# Patient Record
Sex: MALE | Race: WHITE | NOT HISPANIC OR LATINO | Employment: FULL TIME | URBAN - METROPOLITAN AREA
[De-identification: names, ages, dates, MRNs, and addresses within clinical notes are randomized per-mention and may not be internally consistent; named-entity substitution may affect disease eponyms.]

---

## 2022-06-02 ENCOUNTER — PRE VISIT (OUTPATIENT)
Dept: UROLOGY | Facility: CLINIC | Age: 37
End: 2022-06-02
Payer: COMMERCIAL

## 2022-06-02 NOTE — TELEPHONE ENCOUNTER
Action 22 MMT   Action Taken  CSS called [facility] no answer, unable to leave a message. [facility] faxed second urgent request to [facility].       Action 2022 JTV 9:52 AM    Action Taken CSS called patient. Patient did not . [facility] LVM for patient to return call.     @ 10:38 AM, Patient returned call. Patient confirmed he moved from [place] and wants to establish care. Patient has seen Dr Harlan Ferrer with [facility] for the Neurogenic bladder/spina bifida. Patient's PCP is Dr Henok Lucas with [facility]. Patient gave vB OK to update medical records.     @10:53 AM, [facility] sent a request to [facility] for images to be pushed. Fax: [phone]       MEDICAL RECORDS REQUEST   [facility]  Urology Clinic  [address] [place] [address]  PHONE: [phone]  Fax: [phone]        FUTURE VISIT INFORMATION                                                   Ruiz Black, : 1985 scheduled for future visit at [facility]    APPOINTMENT INFORMATION:    Date: 2022    Provider:  Abelino Wylie MD    Reason for Visit/Diagnosis: Neurogenic bladder/spina bifida      RECORDS REQUESTED FOR VISIT                                                     NOTES  STATUS/DETAILS   OFFICE NOTE from other specialist  Yes, 2020, 2019, 2019, 2017 -- Harlan Ferrer MD -- [facility]   OP Visit  Yes, 2019, 2018, 2017 -- Harlan Ferrer MD -- [facility]   MEDICATION LIST  yes, [facility]   LABS     URINALYSIS (UA)  yes   NEUROGENIC BLADDER      NM KIDNEY/ RENAL pending Yes, 2019 -- [facility]    2014    RENAL/BLADDER ULTRASOUND (IMAGES & REPORT) pending Yes, 2017 -- US RENAL -- [facility]     PRE-VISIT CHECKLIST      Record collection complete If no, please explain pending   Appointment appropriately  scheduled           (right time/right provider) Yes   Joint diagnostic appointment coordinated correctly          (ensure right order & amount of time) Yes   MyChart activation Yes   Questionnaire complete If no, please explain pending

## 2022-06-05 ENCOUNTER — HEALTH MAINTENANCE LETTER (OUTPATIENT)
Age: 37
End: 2022-06-05

## 2022-06-16 ENCOUNTER — PRE VISIT (OUTPATIENT)
Dept: UROLOGY | Facility: CLINIC | Age: 37
End: 2022-06-16
Payer: COMMERCIAL

## 2022-06-16 DIAGNOSIS — N31.9 NEUROGENIC BLADDER: Primary | ICD-10-CM

## 2022-06-16 NOTE — TELEPHONE ENCOUNTER
Reason for visit: Establish care      Relevant information: history of spina bifida, bladder augmentation, CIC    Records/imaging/labs/orders: some records available, orders placed for renal US per protocol    Pt called: Message routed to scheduling to set up imaging    At Rooming: julia Castaneda CMA  6/16/2022  4:00 PM

## 2022-06-24 ENCOUNTER — HOSPITAL ENCOUNTER (OUTPATIENT)
Dept: ULTRASOUND IMAGING | Facility: CLINIC | Age: 37
Discharge: HOME | End: 2022-06-24
Attending: UROLOGY | Admitting: UROLOGY
Payer: COMMERCIAL

## 2022-06-24 DIAGNOSIS — N31.9 NEUROGENIC BLADDER: ICD-10-CM

## 2022-06-24 PROCEDURE — 76770 US EXAM ABDO BACK WALL COMP: CPT

## 2022-06-27 ENCOUNTER — OFFICE VISIT (OUTPATIENT)
Dept: UROLOGY | Facility: CLINIC | Age: 37
End: 2022-06-27
Payer: COMMERCIAL

## 2022-06-27 VITALS
BODY MASS INDEX: 32.95 KG/M2 | HEART RATE: 78 BPM | WEIGHT: 205 LBS | HEIGHT: 66 IN | DIASTOLIC BLOOD PRESSURE: 93 MMHG | SYSTOLIC BLOOD PRESSURE: 137 MMHG

## 2022-06-27 DIAGNOSIS — N31.9 NEUROGENIC BLADDER: Primary | ICD-10-CM

## 2022-06-27 PROCEDURE — 99203 OFFICE O/P NEW LOW 30 MIN: CPT | Performed by: UROLOGY

## 2022-06-27 RX ORDER — DIAZEPAM 5 MG
TABLET ORAL
COMMUNITY
Start: 2022-03-28

## 2022-06-27 RX ORDER — TESTOSTERONE CYPIONATE 200 MG/ML
200 INJECTION, SOLUTION INTRAMUSCULAR
COMMUNITY
Start: 2022-05-09

## 2022-06-27 ASSESSMENT — PAIN SCALES - GENERAL: PAINLEVEL: NO PAIN (0)

## 2022-06-27 NOTE — NURSING NOTE
"Chief Complaint   Patient presents with     Consult     Neurogenic bladder - history of spina bifida, CIC 4 x daily. UTI one week ago - muscle aches, fatigue. History of bladder augment. Catheterizes using native urethra.       Blood pressure (!) 137/93, pulse 78, height 1.676 m (5' 6\"), weight 93 kg (205 lb). Body mass index is 33.09 kg/m .    There is no problem list on file for this patient.      No Known Allergies    Current Outpatient Medications   Medication Sig Dispense Refill     testosterone cypionate (DEPOTESTOSTERONE) 200 MG/ML injection Inject 200 mg into the muscle       diazepam (VALIUM) 5 MG tablet TAKE 1/2 TO 1 TABLET DAILY AS NEEDED FOR MUSCLE SPASMS         Social History     Tobacco Use     Smoking status: Never Smoker     Smokeless tobacco: Never Used       Sepideh Castaneda CMA  6/27/2022  10:56 AM     "

## 2022-06-27 NOTE — LETTER
Date:June 27, 2022      Patient was self referred, no letter generated. Do not send.        [facility] Information

## 2022-06-27 NOTE — PATIENT INSTRUCTIONS
Please schedule a renal US in one year and a follow-up visit with Katie Chinchilla PA-C to review the results and symptoms.    It was a pleasure meeting with you today.  Thank you for allowing me and my team the privilege of caring for you today.  YOU are the reason we are here, and I truly hope we provided you with the excellent service you deserve.  Please let us know if there is anything else we can do for you so that we can be sure you are leaving completely satisfied with your care experience.

## 2022-06-27 NOTE — PROGRESS NOTES
"HPI:  Ruiz Black is a 36 year old male being seen for establishing care for neurogenic bladder.    Bladder augment with ureters as a child. CIC per urethra with 14F Coude qid    Stable long-standing right hydro    Does not do any irrigation.    1 UTI/yr    T replacement    Reviewed previous notes from Dr. Stone from PC service at [facility]    Exam:  BP (!) 137/93   Pulse 78   Ht 1.676 m (5' 6\")   Wt 93 kg (205 lb)   BMI 33.09 kg/m    General: age-appropriate appearing male in NAD sitting in a wheelchair  Abdomen: Degree of obesity is mild. Abdomen is soft and nontender. No organomegaly. Surgical scars include midline.  Motor: excellent strength in UEs but weak in LEs    Review of Imaging:  The following imaging exams were independently viewed and interpreted by me and discussed with patient:  Renal/Bladder Ultrasound: Abnormal: mild right hydro; no stones    Review of Labs:  The following labs were reviewed by me and discussed with the patient:  T=794    Assessment & Plan   1. neurogenic bladder from spina bifida  2. Doing well. rtc by video with gabriella in 1 year by video    Abelino Wylie MD  [facility] [place]      ==========================      Additional Coding Information:    Problems:  4 -- two or more stable chronic illnesses    Data Reviewed  Review of external notes as documented above     Tests ordered: none    Independent interpretation of a test performed by another physician/other qualified health care professional (not separately reported) - [place]  Level of risk:  3 -- low risk (e.g., OTC medication or observation, minor surgery without risks)    Time spent:  20 minutes spent on the date of the encounter doing chart review, history and exam, documentation and further activities per the note          "

## 2022-06-27 NOTE — LETTER
"6/27/2022       RE: Ruiz Black  [address] [address] [place] [address]     Dear Colleague,    Thank you for referring your patient, Ruiz Black, to the [facility] [place] at [facility]. Please see a copy of my visit note below.    HPI:  Ruiz Black is a 36 year old male being seen for establishing care for neurogenic bladder.    Bladder augment with ureters as a child. CIC per urethra with 14F Coude qid    Stable long-standing right hydro    Does not do any irrigation.    1 UTI/yr    T replacement    Reviewed previous notes from Dr. Stone from PC service at [facility]    Exam:  BP (!) 137/93   Pulse 78   Ht 1.676 m (5' 6\")   Wt 93 kg (205 lb)   BMI 33.09 kg/m    General: age-appropriate appearing male in NAD sitting in a wheelchair  Abdomen: Degree of obesity is mild. Abdomen is soft and nontender. No organomegaly. Surgical scars include midline.  Motor: excellent strength in UEs but weak in LEs    Review of Imaging:  The following imaging exams were independently viewed and interpreted by me and discussed with patient:  Renal/Bladder Ultrasound: Abnormal: mild right hydro; no stones    Review of Labs:  The following labs were reviewed by me and discussed with the patient:  T=794    Assessment & Plan   1. neurogenic bladder from spina bifida  2. Doing well. rtc by video with gabriella in 1 year by video    Abelino Wylie MD  [facility] [place]      ==========================      Additional Coding Information:    Problems:  4 -- two or more stable chronic illnesses    Data Reviewed  Review of external notes as documented above     Tests ordered: none    Independent interpretation of a test performed by another physician/other qualified health care professional (not separately reported) - US  Level of risk:  3 -- low risk (e.g., OTC medication or observation, minor surgery without " risks)    Time spent:  20 minutes spent on the date of the encounter doing chart review, history and exam, documentation and further activities per the note              Again, thank you for allowing me to participate in the care of your patient.      Sincerely,    Abelino Wylie MD

## 2022-10-16 ENCOUNTER — HEALTH MAINTENANCE LETTER (OUTPATIENT)
Age: 37
End: 2022-10-16

## 2023-06-02 ENCOUNTER — PRE VISIT (OUTPATIENT)
Dept: UROLOGY | Facility: CLINIC | Age: 38
End: 2023-06-02
Payer: COMMERCIAL

## 2023-06-26 ENCOUNTER — HOSPITAL ENCOUNTER (OUTPATIENT)
Dept: ULTRASOUND IMAGING | Facility: CLINIC | Age: 38
Discharge: HOME | End: 2023-06-26
Attending: UROLOGY | Admitting: UROLOGY
Payer: COMMERCIAL

## 2023-06-26 DIAGNOSIS — N31.9 NEUROGENIC BLADDER: ICD-10-CM

## 2023-06-26 PROCEDURE — 76770 US EXAM ABDO BACK WALL COMP: CPT

## 2023-06-27 ENCOUNTER — OFFICE VISIT (OUTPATIENT)
Dept: UROLOGY | Facility: CLINIC | Age: 38
End: 2023-06-27
Payer: COMMERCIAL

## 2023-06-27 VITALS
DIASTOLIC BLOOD PRESSURE: 91 MMHG | WEIGHT: 205 LBS | HEART RATE: 66 BPM | HEIGHT: 66 IN | BODY MASS INDEX: 32.95 KG/M2 | SYSTOLIC BLOOD PRESSURE: 152 MMHG

## 2023-06-27 DIAGNOSIS — N31.9 NEUROGENIC BLADDER: Primary | ICD-10-CM

## 2023-06-27 PROBLEM — R79.89 LOW TESTOSTERONE IN MALE: Status: ACTIVE | Noted: 2021-08-24

## 2023-06-27 PROCEDURE — 99213 OFFICE O/P EST LOW 20 MIN: CPT | Performed by: PHYSICIAN ASSISTANT

## 2023-06-27 ASSESSMENT — PAIN SCALES - GENERAL: PAINLEVEL: NO PAIN (0)

## 2023-06-27 NOTE — LETTER
"6/27/2023       RE: Ruiz Black  [address] [address] [address]     Dear Colleague,    Thank you for referring your patient, Ruiz Black, to the [facility] UROLOGY CLINIC [place] at [facility]. Please see a copy of my visit note below.    Urology Office Visit - Follow Up    Reason for visit: annual follow up    HPI: Ruiz Black is a 37 year old male with a history of neurogenic bladder secondary to spina bifida. Last visit with Dr. Wylie 6/27/2022. Reviewed history from this date as follows:      Ruiz Black is a 36 year old male being seen for establishing care for neurogenic bladder.     Bladder augment with ureters as a child. CIC per urethra with 14F Coude qid     Stable long-standing right hydro     Does not do any irrigation.     1 UTI/yr     T replacement    TODAY   6/27/23:  Doing well.  Leaks only when he drinks coffee.  No recent UTIs.  Interested in vasectomy. Has 2 children - both girls, 2 and 4.    PEx  BP (!) 177/99   Pulse 87   Ht 1.676 m (5' 6\")   Wt 93 kg (205 lb)   BMI 33.09 kg/m    General: age-appropriate appearing male in NAD sitting in a wheelchair  Abdomen: Degree of obesity is mild.   Motor: excellent strength in UEs but weak in LEs    IMAGING:   US RENAL COMPLETE NON-VASCULAR   6/26/2023   FINDINGS:  Right Kidney: It measures 10.2 x 4.5 x 4.6 cm. Mild hydronephrosis. No  shadowing calculi.     Left Kidney: It measures 9.4 x 6.8 x 6.6 cm. No hydronephrosis or  shadowing calculi.     Bladder: Distended and unremarkable.                                                                   IMPRESSION: Mild right hydronephrosis. No left hydronephrosis. No  shadowing calculi in either kidney      ASSESSMENT/PLAN:  37 year old male with neurogenic bladder secondary to spina bifida s/p bladder augment in childhood. Doing well. No change in chronic mild right hydro based on review of yesterday's renal " ultrasound.  -Continue same CIC regimen.  -Interested in a vasectomy so will schedule consultation visit with Dr. Robledo.  -Follow up with me in 1 year with renal ultrasound prior.     Katie Chinchilla PA-C  Department of Urology    29 minutes spent on the date of the encounter doing chart review, review of test results, interpretation of tests, patient visit and documentation

## 2023-06-27 NOTE — PATIENT INSTRUCTIONS
UROLOGY CLINIC VISIT PATIENT INSTRUCTIONS    Schedule a virtual visit with Dr. Robledo to discuss vasectomy.    Follow up virtual visit with Katie Chinchilla PA-C in 1 year with renal ultrasound prior.     If you have any issues, questions or concerns in the meantime, do not hesitate to contact us at [phone] or via [employer].     It was a pleasure meeting with you today.  Thank you for allowing me and my team the privilege of caring for you today.  YOU are the reason we are here, and I truly hope we provided you with the excellent service you deserve.  Please let us know if there is anything else we can do for you so that we can be sure you are leaving completely satisfied with your care experience.

## 2023-06-27 NOTE — PROGRESS NOTES
"Urology Office Visit - Follow Up    Reason for visit: annual follow up    HPI: Ruiz Black is a 37 year old male with a history of neurogenic bladder secondary to spina bifida. Last visit with Dr. Wylie 6/27/2022. Reviewed history from this date as follows:      Ruiz lBack is a 36 year old male being seen for establishing care for neurogenic bladder.     Bladder augment with ureters as a child. CIC per urethra with 14F Coude qid     Stable long-standing right hydro     Does not do any irrigation.     1 UTI/yr     T replacement    TODAY   6/27/23:  Doing well.  Leaks only when he drinks coffee.  No recent UTIs.  Interested in vasectomy. Has 2 children - both girls, 2 and 4.    PEx  BP (!) 177/99   Pulse 87   Ht 1.676 m (5' 6\")   Wt 93 kg (205 lb)   BMI 33.09 kg/m    General: age-appropriate appearing male in NAD sitting in a wheelchair  Abdomen: Degree of obesity is mild.   Motor: excellent strength in UEs but weak in LEs    IMAGING:   US RENAL COMPLETE NON-VASCULAR   6/26/2023   FINDINGS:  Right Kidney: It measures 10.2 x 4.5 x 4.6 cm. Mild hydronephrosis. No  shadowing calculi.     Left Kidney: It measures 9.4 x 6.8 x 6.6 cm. No hydronephrosis or  shadowing calculi.     Bladder: Distended and unremarkable.                                                                   IMPRESSION: Mild right hydronephrosis. No left hydronephrosis. No  shadowing calculi in either kidney      ASSESSMENT/PLAN:  37 year old male with neurogenic bladder secondary to spina bifida s/p bladder augment in childhood. Doing well. No change in chronic mild right hydro based on review of yesterday's renal ultrasound.  -Continue same CIC regimen.  -Interested in a vasectomy so will schedule consultation visit with Dr. Robledo.  -Follow up with me in 1 year with renal ultrasound prior.     Katie Chinchilla PA-C  Department of Urology    29 minutes spent on the date of the encounter doing chart review, review of test results, " interpretation of tests, patient visit and documentation

## 2023-06-27 NOTE — NURSING NOTE
Chief Complaint   Patient presents with     RECHECK     Neurogenic bladder     Allyssa Solano, CMA

## 2023-07-08 NOTE — TELEPHONE ENCOUNTER
MEDICAL RECORDS REQUEST   [facility]  Urology Clinic  [address] [place] [address]  PHONE: [phone]  Fax: [phone]        FUTURE VISIT INFORMATION                                                   Ruiz Black, : 1985 scheduled for future visit at [facility]    APPOINTMENT INFORMATION:    Date: 09/15/2023    Provider:  Girish Robledo MD    Reason for Visit/Diagnosis: VASECTOMY CONSULT      RECORDS REQUESTED FOR VISIT                                                     NOTES  STATUS/DETAILS   MEDICATION LIST  yes     PRE-VISIT CHECKLIST      Joint diagnostic appointment coordinated correctly          (ensure right order & amount of time) Yes   RECORD COLLECTION COMPLETE Yes

## 2023-08-28 ENCOUNTER — PRE VISIT (OUTPATIENT)
Dept: UROLOGY | Facility: CLINIC | Age: 38
End: 2023-08-28
Payer: COMMERCIAL

## 2023-08-28 NOTE — CONFIDENTIAL NOTE
Reason for visit: vasectomy consult    Records/imaging/labs/orders: in epic    Insurance:  Does patient carry state insurance?  No  Examples of state insurance:  Medicaid - this is straight Medical Assistance  [employer] - [place] product through [employer] [facility] - [place] product through [facility] PMAP  [employer] PMAP  [employer] [place]  [employer] PMAP  [facility] PMAP    If YES (Consent for Sterilization must be completed by patient and provider): N/A    At Rooming: julia Aguila  08/28/23  12:03 PM

## 2023-09-15 ENCOUNTER — VIRTUAL VISIT (OUTPATIENT)
Dept: UROLOGY | Facility: CLINIC | Age: 38
End: 2023-09-15
Payer: COMMERCIAL

## 2023-09-15 ENCOUNTER — TELEPHONE (OUTPATIENT)
Dept: UROLOGY | Facility: CLINIC | Age: 38
End: 2023-09-15

## 2023-09-15 ENCOUNTER — PRE VISIT (OUTPATIENT)
Dept: UROLOGY | Facility: CLINIC | Age: 38
End: 2023-09-15

## 2023-09-15 DIAGNOSIS — Z30.09 ENCOUNTER FOR VASECTOMY COUNSELING: Primary | ICD-10-CM

## 2023-09-15 PROCEDURE — 99212 OFFICE O/P EST SF 10 MIN: CPT | Mod: VID | Performed by: UROLOGY

## 2023-09-15 ASSESSMENT — PAIN SCALES - GENERAL: PAINLEVEL: NO PAIN (0)

## 2023-09-15 NOTE — LETTER
9/15/2023       RE: Ruiz Black  [address] [address] [address]     Dear Colleague,    Thank you for referring your patient, Ruiz Black, to the [facility]OLOG[facility]IN[facility] [place] at [facility]. Please see a copy of my visit note below.    CC: Desires sterilization, consult for vasectomy.          HPI: Ruiz Black is a 37 year old male with 2 children, and he is intersted in getting a vasectomy for sterilization.      No history of  trauma or scrotal surgery.  No history of bleeding problems.       [place]      PMH:   Neurogenic bladder- spina bifida.  On TRT.    FAMILY HX:   No history of coagulopathies     ALLERGIES:    No Known Allergies    MEDS:   Current Outpatient Medications   Medication Sig    diazepam (VALIUM) 5 MG tablet TAKE 1/2 TO 1 TABLET DAILY AS NEEDED FOR MUSCLE SPASMS    testosterone cypionate (DEPOTESTOSTERONE) 200 MG/ML injection Inject 200 mg into the muscle     No current facility-administered medications for this visit.       SOCIAL HISTORY:  Social History     Tobacco Use    Smoking status: Never     Passive exposure: Never    Smokeless tobacco: Never        REVIEW OF SYMPTOMS:   Denies testicular pain, ED, ejaculatory problems, rashes in the groin, easy bruising or bleeding.   No constitutional, eye, ENT, heart, lung, GI, musculoskeletal, skin, neurologic, psychiatric, endocrine or hematologic complaints.       GENERAL PHYSICAL EXAM:   Exam  General- Alert, oriented, nad.  Pleasant and conversant.  Eyes- anicteric, EOMI.  Resps- normal, non-labored.  No cough  Abdomen-  nondistended.   exam- deferred.   Neurological - no tremors  Skin - no discoloration/ lesions noted  Psychiatric - no anxiety, alert & oriented.      The rest of a comprehensive physical examination is deferred due to video visit restrictions.         I discussed with him at length the risks and benefits of the procedure. He  understands that this is a sterilization procedure, and not reversible contraception. He understands that reversals, while possible, are not guaranteed to work and fairly complex. I discussed with him the option of sperm cryopreservation.     I stressed that he continues to be fertile in the post-operative period, and that he should continue using other contraceptive methods, such as a condom, until he obtains a semen analysis and we review the results to confirm success of the procedure and infertility. I also stressed to him that recanalization and pregnancy can possibly occur (approx 1/2000 chance), even after we clear him with a semen analysis showing no motile sperm. I counseled him on the [place]-operative risks of bleeding, infection, pain.  I described to him post-vasectomy pain syndrome that can occur in about 1% of men undergoing vasectomy. Usually this improves with time but in very rare cases can be persisting.    We also discussed recovery times (typically days if no complications) and post-operative care including use of ice packs, pain medication and wound care.    Plan:  - Schedule vasectomy procedure in clinic.         Additional Coding Information:    Problems:  one acute uncomplicated illness or injury    Data Reviewed  Minimal or none    Level of risk:   low risk (e.g., OTC medication or observation, minor surgery without risks)    Time spent:  6 minutes spent on the date of the encounter doing chart review, history and exam, documentation and further activities as noted above.     Girish Robledo MD

## 2023-09-15 NOTE — NURSING NOTE
Is the patient currently in the state of [place]? YES    Visit mode:VIDEO    If the visit is dropped, the patient can be reconnected by: VIDEO VISIT: Text to cell phone:   Telephone Information:   Mobile [phone]       Will anyone else be joining the visit? NO  (If patient encounters technical issues they should call [phone])    How would you like to obtain your AVS? MyChart    Are changes needed to the allergy or medication list? No    Reason for visit: Consult (Vasectomy )    Jennifer HARLEY

## 2023-09-15 NOTE — PROGRESS NOTES
CC: Desires sterilization, consult for vasectomy.          HPI: Ruiz Black is a 37 year old male with 2 children, and he is intersted in getting a vasectomy for sterilization.      No history of  trauma or scrotal surgery.  No history of bleeding problems.       [place] [facility]      PMH:   Neurogenic bladder- spina bifida.  On TRT.    FAMILY HX:   No history of coagulopathies     ALLERGIES:    No Known Allergies    MEDS:   Current Outpatient Medications   Medication Sig    diazepam (VALIUM) 5 MG tablet TAKE 1/2 TO 1 TABLET DAILY AS NEEDED FOR MUSCLE SPASMS    testosterone cypionate (DEPOTESTOSTERONE) 200 MG/ML injection Inject 200 mg into the muscle     No current facility-administered medications for this visit.       SOCIAL HISTORY:  Social History     Tobacco Use    Smoking status: Never     Passive exposure: Never    Smokeless tobacco: Never        REVIEW OF SYMPTOMS:   Denies testicular pain, ED, ejaculatory problems, rashes in the groin, easy bruising or bleeding.   No constitutional, eye, ENT, heart, lung, GI, musculoskeletal, skin, neurologic, psychiatric, endocrine or hematologic complaints.       GENERAL PHYSICAL EXAM:   Exam  General- Alert, oriented, nad.  Pleasant and conversant.  Eyes- anicteric, EOMI.  Resps- normal, non-labored.  No cough  Abdomen-  nondistended.   exam- deferred.   Neurological - no tremors  Skin - no discoloration/ lesions noted  Psychiatric - no anxiety, alert & oriented.      The rest of a comprehensive physical examination is deferred due to video visit restrictions.         I discussed with him at length the risks and benefits of the procedure. He understands that this is a sterilization procedure, and not reversible contraception. He understands that reversals, while possible, are not guaranteed to work and fairly complex. I discussed with him the option of sperm cryopreservation.     I stressed that he continues to be fertile in the post-operative period, and that  he should continue using other contraceptive methods, such as a condom, until he obtains a semen analysis and we review the results to confirm success of the procedure and infertility. I also stressed to him that recanalization and pregnancy can possibly occur (approx 1/2000 chance), even after we clear him with a semen analysis showing no motile sperm. I counseled him on the [place]-operative risks of bleeding, infection, pain.  I described to him post-vasectomy pain syndrome that can occur in about 1% of men undergoing vasectomy. Usually this improves with time but in very rare cases can be persisting.    We also discussed recovery times (typically days if no complications) and post-operative care including use of ice packs, pain medication and wound care.    Plan:  - Schedule vasectomy procedure in clinic.         Additional Coding Information:    Problems:  one acute uncomplicated illness or injury    Data Reviewed  Minimal or none    Level of risk:   low risk (e.g., OTC medication or observation, minor surgery without risks)    Time spent:  6 minutes spent on the date of the encounter doing chart review, history and exam, documentation and further activities as noted above.

## 2023-09-21 ENCOUNTER — MYC MEDICAL ADVICE (OUTPATIENT)
Dept: UROLOGY | Facility: CLINIC | Age: 38
End: 2023-09-21

## 2023-09-21 NOTE — LETTER
Kristina Melchor,      Our records show that you are scheduled to have an upcoming in clinic procedure with Dr. Robledo.      Future Appointments 9/21/2023 - 3/19/2024          Date Visit Type Length Department Provider       11/15/2023  3:30 PM VASECTOMY 60 min  UROLOGY Girish Robledo MD                                 Below I have attached some reading material for you to review. Please let us know if you have any questions or concerns. This appointment will take place in [place].The address for [facility] is [address] , [place] [address]. You will check in at Desk D this is located on the second floor.      Vasectomy     Understanding Vasectomy  Vasectomy is a simple, safe procedure that makes a man sterile (unable to father a child). It is the most effective birth control method for men.     Your Reproductive System  For pregnancy to occur, a man s sperm (male reproductive cells) must join with a woman s egg. To understand how a vasectomy works, you need to know how sperm are produced, stored, and released by the body.  The urethra is the tube in the center of the penis. It transports both urine and semen. When you have an orgasm, semen is ejaculated out of the urethra.  The seminal vesicles and the prostate gland secrete fluid called semen.  This sticky, white fluid helps nourish sperm and carry them along.  The epididymis is a coiled tube that holds the sperm while they mature.  The scrotum is a pouch of skin that contains the testes.  The testes are glands that produce sperm and male hormones.  The vas deferens is the tubes that carry the sperm from the epididymis to the penis.  Sperm carry genetic material.     Risks and Complications  A vasectomy is an outpatient (same day) procedure. It will be done in the clinic or in the same day surgery center. Before your vasectomy will be performed, you ll be asked to read and sign a consent form. This form stated  you re aware of the possible risks and complications and understand that the procedure, though usually successful, is not guaranteed to make you sterile. Be sure that you have all your questions answered before signing this form. After the procedure, if you have any of the following or other symptoms you re concerned about, call your doctor.     Possible Risks and Complications  Vasectomy is a safe procedure. But it does have risks, including bleeding and infection.  You may also have any of the following after surgery.  Sperm granuloma is a small, harmless lump that may form where the vas deferens is sealed off.  Sperm buildup (congestion) may cause soreness in the testes.  Anti-inflammatory medications can provide relief.  Epididymitis is inflammation that may cause scrotal aching. This often goes away without treatment. Occasionally, antibiotics are required.  Anti-inflammatory medications may provide relief.  Reconnection of the vas deferens can occur in rare cases. This makes you fertile again and can result in an unwanted pregnancy.  Sperm antibodies are a common response of the body to absorbed sperm. The antibodies can make you sterile, even if you later try to reverse your vasectomy.  Long term testicular discomfort may occur after surgery, but is very rare.     Vasectomy Preparation     Shave all hair from around the penis and the entire scrotum. You should do this on the day of the vasectomy: 2-4 hours prior to your appointment. This serves to cut down on the risk of infection. You may lather the scrotum with soap and water and shave with a disposable blade razor. Do not use an electric razor or depilatory hair creams.  After shaving the area, shower or bathe to remove all loose hairs.  Bring a scrotal support or tight cotton shorts with you on the day of your procedure.  Bring headphones/music if you would like to use during the procedure.  You may drive yourself to this procedure, only a local  anesthetic is used.     During the Procedure  You will be asked to undress from the waist down and lie on the exam table. Anticeptic solution will be applied to the scrotal areas. Sterile drapes are placed over you to help prevent infection. You will be given a local anesthetic into your scrotum to prevent you from feeling pain. Once the anesthetic takes effect, the doctor makes one puncture in each side of the scrotum with a pointed clamp. Each of the two vasa deferentia are lifted through this puncture site. The vasa are cut, and a section is removed. You may feel a pulling sensation during this process. The two cut ends are sealed by heat (cauterized) and also tied. The puncture is then sutured with a stitch.     After the Procedure  The local anesthetic begins to wear off after an hour or so. Any discomfort you feel is usually very mild. If you need it, pain reliever may help.     During Your Healing  Recovery time after a no-scalpel vasectomy is usually less than after a traditional vasectomy. For about a week, your scrotum may look bruised and slightly swollen.  You may also have a small amount of bloody discharge from the incision. This is normal.     To help make your recovery more comfortable, follow the tips below.  Stay off your feet as much as possible for the first few days to lessen the chance of swelling. Try to lie flat on a bed or sofa.  Reduce swelling by placing an ice pack or bag of frozen peas in a thin towel. Then place the towel on your scrotum.  Wear snug cotton briefs or an athletic supporter.  Take medications with acetaminophen (such as Tylenol) to relieve any discomfort.   Don t use aspirin, ibuprofen or naproxen.  Your doctor may give you a pain pill prescription.  Wait 48 hours before bathing.  Avoid heavy lifting or exercise for at least 10 days.  You can return to work in a day or two after the procedure.  You can begin having sex again two weeks after the procedure.     Note: You  must use some form of birth control until your doctor says you re sterile.     Call your doctor if you notice any of the following after surgery:  Increasing pain or swelling in your scrotum  A large black-and-blue area, or a growing lump (some bruising is normal)  Fever or chills  Increasing redness or drainage of the incision     Sex after Vasectomy  Vasectomy doesn t change your sexual function. So when you start having sex again, it should feel the same as before. A vasectomy also shouldn t affect your relationship with your partner. It s important to remember, though, that you won t be sterile right away. It will take time before you can have sex without the need for birth control.     Until you re sterile: After a vasectomy, some active sperm still remain in your semen. It will take time and many ejaculations before the sperm are completely gone. During this period, you must use another birth control method to prevent pregnancy. To make sure no sperm are left in your semen, you ll need to have at least one semen exam. You usually collect a semen sample at home and bring it to a lab. The sample is then checked under a microscope. You are sterile only when the sample(s) shows no evidence of sperm. Ask your doctor whether additional follow-up is needed.     After you re sterile: After your doctor tells you you re sterile, you no longer need to use any form of birth control. You re free to have sex without the fear of unwanted pregnancy. However, a vasectomy does not protect you from sexually transmitted diseases (STDs). If you have more than one sex partner, be sure to practice safer sex by using condoms.     Thank you,   Your Urology Care Team

## 2023-09-21 NOTE — TELEPHONE ENCOUNTER
[facility] Call Center    Phone Message    May a detailed message be left on voicemail: yes     Reason for Call: Other: Patient returning call to [place] to schedule vasectomy.  Per patient, he was given a few options for both [facility] and [facility].  Patient would like to schedule at first available with [facility] as [facility] is scheduled out to next year.  Per protocols, writer not able to schedule procedure at [facility] location. Please reach out to patient.      Action Taken: Message routed to:  Other: Urology    Travel Screening: Not Applicable

## 2023-11-15 ENCOUNTER — OFFICE VISIT (OUTPATIENT)
Dept: UROLOGY | Facility: CLINIC | Age: 38
End: 2023-11-15
Payer: COMMERCIAL

## 2023-11-15 DIAGNOSIS — Z30.2 ENCOUNTER FOR VASECTOMY: Primary | ICD-10-CM

## 2023-11-15 PROCEDURE — 55250 REMOVAL OF SPERM DUCT(S): CPT | Performed by: UROLOGY

## 2023-11-15 NOTE — PROGRESS NOTES
Procedure: Vasectomy bilateral.   Anesthesia: Local lidocaine injection by surgeon.  Surgeon: RAD Robledo M.D.   Assistant:  urology nursing staff present    Description of procedure: The patient has received education regarding vasectomy, including risks and benefits, we obtaiined consent and proceeded with the surgery. He understands that there is a risk of late failure from recanalization. He understands that he is fertile until he has completed one or more semen analyses and he is given clearance from us for unprotected intercourse.  He understands that we will contact him regarding the results of the post-vasectomy semen analysis, but it is his responsibility to make sure he is cleared before discontinuing other birth control methods.  I have discussed with him other risks including bleeding, infection, acute and possible long-term pain.    The right vas was ligated first.  This was done using the standard technique by grasping it with a three-finger  and lifting it up to the skin.  A small amount of lidocaine was infiltrated into the skin and vasal sheath.  The skin was punctured and dilated bluntly using the scalpel-less dissector.  The sheath was identified and a rigid clamp was passed around the vas which was then lifted out of the incision.  The vas was cleaned off from the deferential vessels and the sheath, and cautery was used to divide the vas between mosquitos.  A small segment was removed and discarded.  The lumen of the vas was cannulated to confirm its identity, and the lumens of both ends were cauterized.  Pen cautery was used sparingly/as needed for minor bleeders.  A facial interposition was then performed with a single suture of 4-0 Monocryl. The skin defect was closed with a 4-0 chromic interrupted suture after confirming adequate hemostasis.       We then turned our attention to the left side and a similar technique was performed. This involved division, excision of a segment, cautery of  the lumens, and fascial interposition.    There were no hematomas noted at the end of the procedure.  The patient tolerated the procedure well.    He was advised to return for a post vasectomy semen check in 2-3 months, and that he is not sterile and must continue to use contraception until we tell him otherwise (based on follow-up semen specimen(s)).    Plan:  -Post vasectomy semen analysis in 2-3 months   -ice packs to scrotum X 24h  -shower ok tomorrow  -OTC analgesics recommended     Melany LOVETT

## 2023-11-15 NOTE — PATIENT INSTRUCTIONS
Vasectomy Post-Op Care Instructions     You may go home after the procedure is completed. There may be some pain in your groin for 3 or 4 days after the operation. Some blood or yellow liquid may ooze from the cuts on the outside. The area around the cuts may swell and bruise. The sutures will dissolve on their own and do not require removal by the physician.    The first 48 hours after the procedure are crucial to healing. Generally, you may feel very good the day after the procedure, but that does not mean it is time to go back to normal activities. Resuming normal activities too soon is likely to cause internal bleeding and lots of pain.      Your provider may advise the following ways to care for yourself after the procedure:    Put an ice bag or package of frozen peas covered by a thin towel over the scrotum after the procedure. Leave the ice on the area for 30 minutes and then take it off for 30 minutes. Do this off and on for at least 24 hours.      Avoid all heavy lifting (greater than 10 pounds) for at least one week.    Wear a jockstrap or tight-fitting underwear for approximately one week to support the scrotum (testicles) and help reduce discomfort.    Take a pain reliever, such as Acetaminophen (Tylenol) or Ibuprofen (Advil), for any pain after the procedure. Your provider may prescribe a stronger pain medicine if it is needed.    You should be able to return to work in 48 hours, but strenuous activity should be avoided for two weeks.    Do not submerge the incision for 1 week following the procedure.    Ejaculation should be avoided for one week to allow the area to heal.    Follow-Up    You will need to have a negative post vasectomy analyses (no sperm seen) before you can discontinue birth control.    Semen Analysis   Schedule the post-vasectomy semen analysis three months after your vasectomy. You will need to ejaculate at least 20 times between your surgery and the first sample. Clinic staff will  contact your regarding your results via phone. You can schedule the post vasectomy semen analysis by calling the lab below    [facility]  [facility]  [address] [place] [address] [phone].

## 2024-04-23 ENCOUNTER — TRANSFERRED RECORDS (OUTPATIENT)
Dept: HEALTH INFORMATION MANAGEMENT | Facility: CLINIC | Age: 39
End: 2024-04-23

## 2024-06-01 ENCOUNTER — HEALTH MAINTENANCE LETTER (OUTPATIENT)
Age: 39
End: 2024-06-01

## 2024-06-17 ENCOUNTER — HOSPITAL ENCOUNTER (OUTPATIENT)
Dept: ULTRASOUND IMAGING | Facility: CLINIC | Age: 39
Discharge: HOME | End: 2024-06-17
Attending: PHYSICIAN ASSISTANT | Admitting: PHYSICIAN ASSISTANT
Payer: COMMERCIAL

## 2024-06-17 DIAGNOSIS — N31.9 NEUROGENIC BLADDER: ICD-10-CM

## 2024-06-17 PROCEDURE — 76770 US EXAM ABDO BACK WALL COMP: CPT

## 2024-06-18 ENCOUNTER — VIRTUAL VISIT (OUTPATIENT)
Dept: UROLOGY | Facility: CLINIC | Age: 39
End: 2024-06-18
Payer: COMMERCIAL

## 2024-06-18 ENCOUNTER — TELEPHONE (OUTPATIENT)
Dept: UROLOGY | Facility: CLINIC | Age: 39
End: 2024-06-18

## 2024-06-18 DIAGNOSIS — N31.9 NEUROGENIC BLADDER: Primary | ICD-10-CM

## 2024-06-18 PROCEDURE — 99213 OFFICE O/P EST LOW 20 MIN: CPT | Mod: 95 | Performed by: PHYSICIAN ASSISTANT

## 2024-06-18 RX ORDER — LISINOPRIL AND HYDROCHLOROTHIAZIDE 20; 25 MG/1; MG/1
1 TABLET ORAL DAILY
COMMUNITY
Start: 2024-04-11

## 2024-06-18 NOTE — PROGRESS NOTES
Urology Virtual Visit - Follow Up    Reason for visit: annual follow up    HPI: Ruiz Black is a 38 year old male with a history of neurogenic bladder secondary to spina bifida. Last visit with Dr. Wylie 6/27/2022. Reviewed history from this date as follows:      Ruiz Black is a 36 year old male being seen for establishing care for neurogenic bladder.     Bladder augment with ureters as a child. CIC per urethra with 14F Coude qid     Stable long-standing right hydro     Does not do any irrigation.     1 UTI/yr     T replacement    Last visit with me  6/27/23:  Doing well.  Leaks only when he drinks coffee.  No recent UTIs.  Interested in vasectomy. Has 2 children - both girls, 2 and 4.    TODAY   6/18/2024:  Ruiz continues to do well. He has been catheterizing 4 times per day, but would like to increase to 5 times per day since he drinks more water in the summer. Using 16 Fr Coude tip hydrophilic catheters.  No recent UTIs or difficulty catheterizing.     PEx  GENERAL: alert and no distress  EYES: Eyes grossly normal to inspection.  No discharge or erythema, or obvious scleral/conjunctival abnormalities.  RESP: No audible wheeze, cough, or visible cyanosis.    SKIN: Visible skin clear. No significant rash, abnormal pigmentation or lesions.  NEURO: Cranial nerves grossly intact.  Mentation and speech appropriate for age.  PSYCH: Appropriate affect, tone, and pace of words    LAB:  5/13/2024 - serum Cr: 1.33  4/3/2024 - serum Cr: 1.34    4/3/2024 - PSA: 1.04    IMAGING:   FINDINGS:     RIGHT KIDNEY: 10.3 x 2.7 x 3.6 cm. Mild cortical thinning and  lobulated cortex. Mild hydronephrosis, stable. No renal masses.      LEFT KIDNEY: 9.4 x 6.0 x 6.3 cm. No hydronephrosis. Limited  visualization of the left kidney due to shadowing bowel gas.     BLADDER: Partly distended.                                                                      IMPRESSION:  1.  Stable mild right  hydronephrosis.      ASSESSMENT/PLAN:  38 year old male with neurogenic bladder secondary to spina bifida s/p bladder augment in childhood. Doing well. No change in chronic mild right hydro based on review of yesterday's renal ultrasound.  -Continue same CIC regimen but increase to 5 times per day.  -Follow up with Haley Alvarez CNP in 1 year with renal ultrasound prior.     Katie Chinchilla PA-C  Department of Urology      Virtual Visit Details    Type of service:  Video Visit     Video start time: 8:56 AM    Video end time: 9:09 AM    Originating Location (pt. Location): Home    Distant Location (provider location):  Off-site  Platform used for Video Visit: [facility]    17 minutes spent on the date of the encounter doing chart review, review of test results, interpretation of tests, patient visit, and documentation

## 2024-06-18 NOTE — TELEPHONE ENCOUNTER
Left Voicemail (1st Attempt) for the patient to call back and schedule the following:    Appointment type: Return   Provider: Haley Alvarez   Return date: June 2025  Specialty phone number: [phone]  Additional appointment(s) needed: Renal Ultrasound prior   Additonal Notes: Per check out - Follow up with Haley Alvarez CNP in 1 year with same day kidney/bladder ultrasound prior.

## 2024-06-18 NOTE — NURSING NOTE
Is the patient currently in the state of [place]? YES    Visit mode:VIDEO    If the visit is dropped, the patient can be reconnected by: VIDEO VISIT: Text to cell phone:   Telephone Information:   Mobile [phone]       Will anyone else be joining the visit? NO  (If patient encounters technical issues they should call [phone]    How would you like to obtain your AVS? MyChart    Are changes needed to the allergy or medication list? No    Are refills needed on medications prescribed by this physician? NO    Reason for visit: RECHECK    Becky HARLEY

## 2024-06-18 NOTE — PATIENT INSTRUCTIONS
UROLOGY CLINIC VISIT PATIENT INSTRUCTIONS    Follow up with Haley Alvarez CNP in 1 year with same day kidney/bladder ultrasound prior.    Increase catheterization to 5 times per day. Will send new orders to [address] [place] [address].    If you have any issues, questions or concerns in the meantime, do not hesitate to contact us at [phone] or via [employer].     It was a pleasure meeting with you today.  Thank you for allowing me and my team the privilege of caring for you today.  YOU are the reason we are here, and I truly hope we provided you with the excellent service you deserve.  Please let us know if there is anything else we can do for you so that we can be sure you are leaving completely satisfied with your care experience.

## 2024-06-18 NOTE — LETTER
6/18/2024       RE: Ruiz Black  [address] [address]     Dear Colleague,    Thank you for referring your patient, Ruiz Black, to the [facility] [place] [facility]. Please see a copy of my visit note below.    Urology Virtual Visit - Follow Up    Reason for visit: annual follow up    HPI: Ruiz Black is a 38 year old male with a history of neurogenic bladder secondary to spina bifida. Last visit with Dr. Wylie 6/27/2022. Reviewed history from this date as follows:      Ruiz Black is a 36 year old male being seen for establishing care for neurogenic bladder.     Bladder augment with ureters as a child. CIC per urethra with 14F Coude qid     Stable long-standing right hydro     Does not do any irrigation.     1 UTI/yr     T replacement    Last visit with me  6/27/23:  Doing well.  Leaks only when he drinks coffee.  No recent UTIs.  Interested in vasectomy. Has 2 children - both girls, 2 and 4.    TODAY   6/18/2024:  Ruiz continues to do well. He has been catheterizing 4 times per day, but would like to increase to 5 times per day since he drinks more water in the summer. Using 16 Fr Coude tip hydrophilic catheters.  No recent UTIs or difficulty catheterizing.     PEx  GENERAL: alert and no distress  EYES: Eyes grossly normal to inspection.  No discharge or erythema, or obvious scleral/conjunctival abnormalities.  RESP: No audible wheeze, cough, or visible cyanosis.    SKIN: Visible skin clear. No significant rash, abnormal pigmentation or lesions.  NEURO: Cranial nerves grossly intact.  Mentation and speech appropriate for age.  PSYCH: Appropriate affect, tone, and pace of words    LAB:  5/13/2024 - serum Cr: 1.33  4/3/2024 - serum Cr: 1.34    4/3/2024 - PSA: 1.04    IMAGING:   FINDINGS:     RIGHT KIDNEY: 10.3 x 2.7 x 3.6 cm. Mild cortical thinning and  lobulated cortex. Mild hydronephrosis, stable.  No renal masses.      LEFT KIDNEY: 9.4 x 6.0 x 6.3 cm. No hydronephrosis. Limited  visualization of the left kidney due to shadowing bowel gas.     BLADDER: Partly distended.                                                                      IMPRESSION:  1.  Stable mild right hydronephrosis.      ASSESSMENT/PLAN:  38 year old male with neurogenic bladder secondary to spina bifida s/p bladder augment in childhood. Doing well. No change in chronic mild right hydro based on review of yesterday's renal ultrasound.  -Continue same CIC regimen but increase to 5 times per day.  -Follow up with Haley Alvarez CNP in 1 year with renal ultrasound prior.     Katie Chinchilla PA-C  Department of Urology      Virtual Visit Details    Type of service:  Video Visit     Video start time: 8:56 AM    Video end time: 9:09 AM    Originating Location (pt. Location): Home    Distant Location (provider location):  Off-site  Platform used for Video Visit: [employer]    17 minutes spent on the date of the encounter doing chart review, review of test results, interpretation of tests, patient visit, and documentation

## 2024-06-19 DIAGNOSIS — N31.9 NEUROGENIC BLADDER: Primary | ICD-10-CM

## 2024-06-19 NOTE — PROGRESS NOTES
Order placed for 16 Fr coude' tip hydrophilic catheters  for 5x/day  CIC.    Thank you,  Charo Donnelly RN, BSN Urology Triage

## 2024-08-07 ENCOUNTER — MEDICAL CORRESPONDENCE (OUTPATIENT)
Dept: HEALTH INFORMATION MANAGEMENT | Facility: CLINIC | Age: 39
End: 2024-08-07
Payer: COMMERCIAL

## 2024-08-07 ENCOUNTER — DOCUMENTATION ONLY (OUTPATIENT)
Dept: UROLOGY | Facility: CLINIC | Age: 39
End: 2024-08-07
Payer: COMMERCIAL

## 2024-08-07 NOTE — PROGRESS NOTES
Type of Form Received: Order (lubricant, coude catheter)    Form Received (Date) 8/7/24   Form Filled out Yes, date 8/7/24   Placed in provider folder Yes       Received Completed forms Yes   Faxed Forms Faxed To: [address] [place] [address]  Fax Number: [phone]   Sent to HIM (Date) 8/7/24

## 2025-04-12 ENCOUNTER — HEALTH MAINTENANCE LETTER (OUTPATIENT)
Age: 40
End: 2025-04-12

## 2025-06-18 ENCOUNTER — TELEPHONE (OUTPATIENT)
Dept: UROLOGY | Facility: CLINIC | Age: 40
End: 2025-06-18
Payer: COMMERCIAL

## 2025-06-18 NOTE — TELEPHONE ENCOUNTER
Patient confirmed scheduled appointment:  Date: 8/1  Time: 915  Visit type: Return  Provider: Haley  Location: [facility]  Testing/imaging:   Additional notes: